# Patient Record
Sex: FEMALE | Race: WHITE | NOT HISPANIC OR LATINO | Employment: OTHER | URBAN - METROPOLITAN AREA
[De-identification: names, ages, dates, MRNs, and addresses within clinical notes are randomized per-mention and may not be internally consistent; named-entity substitution may affect disease eponyms.]

---

## 2019-01-23 ENCOUNTER — OFFICE VISIT (OUTPATIENT)
Dept: NEUROLOGY | Facility: CLINIC | Age: 74
End: 2019-01-23
Payer: MEDICARE

## 2019-01-23 VITALS
BODY MASS INDEX: 43.04 KG/M2 | WEIGHT: 274.25 LBS | HEART RATE: 72 BPM | HEIGHT: 67 IN | SYSTOLIC BLOOD PRESSURE: 119 MMHG | DIASTOLIC BLOOD PRESSURE: 65 MMHG

## 2019-01-23 DIAGNOSIS — L98.9 LESION OF SKIN OF SCALP: ICD-10-CM

## 2019-01-23 DIAGNOSIS — I10 HYPERTENSION, UNSPECIFIED TYPE: ICD-10-CM

## 2019-01-23 DIAGNOSIS — E11.65 UNCONTROLLED TYPE 2 DIABETES MELLITUS WITH HYPERGLYCEMIA: ICD-10-CM

## 2019-01-23 DIAGNOSIS — H53.8 BLURRY VISION: ICD-10-CM

## 2019-01-23 PROBLEM — S00.03XA SCALP HEMATOMA: Status: ACTIVE | Noted: 2019-01-23

## 2019-01-23 PROBLEM — E11.9 TYPE 2 DIABETES MELLITUS: Status: ACTIVE | Noted: 2019-01-23

## 2019-01-23 PROCEDURE — 99999 PR PBB SHADOW E&M-NEW PATIENT-LVL IV: CPT | Mod: PBBFAC,GC,, | Performed by: PSYCHIATRY & NEUROLOGY

## 2019-01-23 PROCEDURE — 99999 PR PBB SHADOW E&M-NEW PATIENT-LVL IV: ICD-10-PCS | Mod: PBBFAC,GC,, | Performed by: PSYCHIATRY & NEUROLOGY

## 2019-01-23 RX ORDER — ERGOCALCIFEROL 1.25 MG/1
50000 CAPSULE ORAL
COMMUNITY
End: 2023-01-18

## 2019-01-23 RX ORDER — LEVOTHYROXINE SODIUM 88 UG/1
88 TABLET ORAL DAILY
COMMUNITY

## 2019-01-23 RX ORDER — ISOSORBIDE MONONITRATE 30 MG/1
30 TABLET, EXTENDED RELEASE ORAL DAILY
COMMUNITY
End: 2023-01-18

## 2019-01-23 RX ORDER — ASPIRIN 81 MG/1
81 TABLET ORAL DAILY
COMMUNITY

## 2019-01-23 RX ORDER — DIPHENHYDRAMINE HCL 25 MG
25 CAPSULE ORAL EVERY 6 HOURS PRN
COMMUNITY

## 2019-01-23 RX ORDER — METOPROLOL TARTRATE 50 MG/1
50 TABLET ORAL 2 TIMES DAILY
COMMUNITY
End: 2023-01-18

## 2019-01-23 RX ORDER — OLMESARTAN MEDOXOMIL 20 MG/1
20 TABLET ORAL DAILY
COMMUNITY

## 2019-01-23 RX ORDER — MECLIZINE HYDROCHLORIDE CHEWABLE TABLETS 25 MG/1
32 TABLET, CHEWABLE ORAL 3 TIMES DAILY PRN
COMMUNITY
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
## 2019-01-26 PROBLEM — L98.9 LESION OF SKIN OF SCALP: Status: ACTIVE | Noted: 2019-01-23

## 2019-01-27 NOTE — ASSESSMENT & PLAN NOTE
Patient would like evaluation of this lesion that has been present for several years and reported to have been inflamed since the last couple of head traumas.    CT head ordered for blurry vision to evaluate for this as well

## 2019-01-27 NOTE — ASSESSMENT & PLAN NOTE
Reported Hba1c of 9  - has retinopathy, neuropathy and kidney complications from diabetes and HTN  - defer management to PCP

## 2019-01-31 ENCOUNTER — HOSPITAL ENCOUNTER (OUTPATIENT)
Dept: RADIOLOGY | Facility: HOSPITAL | Age: 74
Discharge: HOME | End: 2019-01-31
Attending: PSYCHIATRY & NEUROLOGY
Payer: MEDICARE

## 2019-01-31 DIAGNOSIS — H53.8 BLURRY VISION: ICD-10-CM

## 2019-01-31 DIAGNOSIS — L98.9 LESION OF SKIN OF SCALP: ICD-10-CM

## 2019-01-31 PROCEDURE — 70450 CT HEAD/BRAIN W/O DYE: CPT | Mod: 26,,, | Performed by: RADIOLOGY

## 2019-01-31 PROCEDURE — 70450 CT HEAD WITHOUT CONTRAST: ICD-10-PCS | Mod: 26,,, | Performed by: RADIOLOGY

## 2019-01-31 PROCEDURE — 70450 CT HEAD/BRAIN W/O DYE: CPT | Mod: TC

## 2019-02-06 ENCOUNTER — CLINICAL SUPPORT (OUTPATIENT)
Dept: OPHTHALMOLOGY | Facility: CLINIC | Age: 74
End: 2019-02-06
Payer: MEDICARE

## 2019-02-06 ENCOUNTER — OFFICE VISIT (OUTPATIENT)
Dept: OPHTHALMOLOGY | Facility: CLINIC | Age: 74
End: 2019-02-06
Payer: MEDICARE

## 2019-02-06 DIAGNOSIS — H53.481: ICD-10-CM

## 2019-02-06 DIAGNOSIS — H46.9 RIGHT OPTIC NEUROPATHY: ICD-10-CM

## 2019-02-06 DIAGNOSIS — E11.65 UNCONTROLLED TYPE 2 DIABETES MELLITUS WITH HYPERGLYCEMIA: Primary | ICD-10-CM

## 2019-02-06 PROCEDURE — 99999 PR PBB SHADOW E&M-EST. PATIENT-LVL III: ICD-10-PCS | Mod: PBBFAC,,, | Performed by: OPHTHALMOLOGY

## 2019-02-06 PROCEDURE — 92083 EXTENDED VISUAL FIELD XM: CPT | Mod: S$GLB,,, | Performed by: OPHTHALMOLOGY

## 2019-02-06 PROCEDURE — 92083 HUMPHREY VISUAL FIELD - OU - BOTH EYES: ICD-10-PCS | Mod: S$GLB,,, | Performed by: OPHTHALMOLOGY

## 2019-02-06 PROCEDURE — 99999 PR PBB SHADOW E&M-EST. PATIENT-LVL I: CPT | Mod: PBBFAC,,,

## 2019-02-06 PROCEDURE — 99999 PR PBB SHADOW E&M-EST. PATIENT-LVL I: ICD-10-PCS | Mod: PBBFAC,,,

## 2019-02-06 PROCEDURE — 92004 PR EYE EXAM, NEW PATIENT,COMPREHESV: ICD-10-PCS | Mod: S$GLB,,, | Performed by: OPHTHALMOLOGY

## 2019-02-06 PROCEDURE — 99999 PR PBB SHADOW E&M-EST. PATIENT-LVL III: CPT | Mod: PBBFAC,,, | Performed by: OPHTHALMOLOGY

## 2019-02-06 PROCEDURE — 92004 COMPRE OPH EXAM NEW PT 1/>: CPT | Mod: S$GLB,,, | Performed by: OPHTHALMOLOGY

## 2019-02-06 RX ORDER — OMEPRAZOLE 40 MG/1
40 CAPSULE, DELAYED RELEASE ORAL DAILY
Refills: 5 | COMMUNITY
Start: 2019-01-22 | End: 2023-09-13

## 2019-02-06 RX ORDER — ALENDRONATE SODIUM 70 MG/1
TABLET ORAL
Refills: 5 | COMMUNITY
Start: 2019-01-20

## 2019-02-06 RX ORDER — METOPROLOL SUCCINATE 50 MG/1
TABLET, EXTENDED RELEASE ORAL
Refills: 11 | COMMUNITY
Start: 2019-01-22

## 2019-02-06 RX ORDER — INSULIN ASPART 100 [IU]/ML
INJECTION, SUSPENSION SUBCUTANEOUS
Refills: 1 | COMMUNITY
Start: 2019-01-21 | End: 2023-01-18
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
## 2019-02-06 NOTE — LETTER
[facility]  [address]  [facility] [address]  Phone: [phone]  Fax: [phone]   February 6, 2019    Hetal Hill MD  [address]  [facility] [address]    Patient: Joanne Hester   MR Number: [id number]   YOB: 1945   Date of Visit: 2/6/2019       Dear Dr. Hill:    Thank you for referring Joanne Hester to me for evaluation. Here is my assessment and plan of care:    Assessment:   /Plan     For exam results, see Encounter Report.    Uncontrolled type 2 diabetes mellitus with hyperglycemia  -     CBC auto differential; Future; Expected date: 02/06/2019  -     Sedimentation rate, manual; Future; Expected date: 02/06/2019  -     C-REACTIVE PROTEIN; Future; Expected date: 02/06/2019    Generalized constriction of visual field, right  -     Grijalva Visual Field - OU - Extended - Both Eyes  -     CBC auto differential; Future; Expected date: 02/06/2019  -     Sedimentation rate, manual; Future; Expected date: 02/06/2019  -     C-REACTIVE PROTEIN; Future; Expected date: 02/06/2019    Right optic neuropathy  -     CBC auto differential; Future; Expected date: 02/06/2019  -     Sedimentation rate, manual; Future; Expected date: 02/06/2019  -     C-REACTIVE PROTEIN; Future; Expected date: 02/06/2019      Ms. Hester has a right optic neuropathy. Unfortunately, I did not see her at the time of the initial visual loss. She has two possible causes. One would be anterior ischemic optic neuropathy either arteritic or non-arteritic and the second would be traumatic optic neuropathy. Neither is treatable. I have ordered blood testing to rule out giant cell arteritis. I will repeat her exam and visual field testing in two months.          Plan:       For exam results, see Encounter Report.    Uncontrolled type 2 diabetes mellitus with hyperglycemia  -     CBC auto differential; Future; Expected date: 02/06/2019  -     Sedimentation rate, manual; Future; Expected date:  02/06/2019  -     C-REACTIVE PROTEIN; Future; Expected date: 02/06/2019    Generalized constriction of visual field, right  -     Grijalva Visual Field - OU - Extended - Both Eyes  -     CBC auto differential; Future; Expected date: 02/06/2019  -     Sedimentation rate, manual; Future; Expected date: 02/06/2019  -     C-REACTIVE PROTEIN; Future; Expected date: 02/06/2019    Right optic neuropathy  -     CBC auto differential; Future; Expected date: 02/06/2019  -     Sedimentation rate, manual; Future; Expected date: 02/06/2019  -     C-REACTIVE PROTEIN; Future; Expected date: 02/06/2019      Ms. Hester has a right optic neuropathy. Unfortunately, I did not see her at the time of the initial visual loss. She has two possible causes. One would be anterior ischemic optic neuropathy either arteritic or non-arteritic and the second would be traumatic optic neuropathy. Neither is treatable. I have ordered blood testing to rule out giant cell arteritis. I will repeat her exam and visual field testing in two months.            Below you will find my full exam findings. If you have questions, please do not hesitate to call me. I look forward to following Ms. Joanne Hester along with you.    Sincerely,          Gagan Samano MD       CC  Yovanny Constantino MD             Base Eye Exam     Visual Acuity (Snellen - Linear)       Right Left    Dist sc 20/25 -2 20/30    Dist ph sc NI 20/25 -1          Tonometry (Applanation, 1:44 PM)       Right Left    Pressure 17 16          Pupils       Dark Light Shape React APD    Right 4 2 Round Brisk +3    Left 4 2 Round Brisk None          Visual Fields     Counting fingers          Extraocular Movement       Right Left     Full, Ortho Full, Ortho          Neuro/Psych     Oriented x3:  Yes    Mood/Affect:  Normal          Dilation     Both eyes:  1% Mydriacyl, 2.5% Phenylephrine @ 2:47 PM            Additional Tests     Color       Right Left    Ishihara 14 14            Slit Lamp and  Fundus Exam     External Exam       Right Left    External Normal Normal          Slit Lamp Exam       Right Left    Lids/Lashes Normal Normal    Conjunctiva/Sclera White and quiet White and quiet    Cornea Clear Clear    Anterior Chamber Deep and quiet Deep and quiet    Iris Round and reactive Round and reactive    Lens Posterior chamber intraocular lens Posterior chamber intraocular lens    Vitreous Normal Normal          Fundus Exam       Right Left    Disc 3+ Pallor, diffuse Normal    C/D Ratio 0.3 0.3    Macula Normal Normal    Vessels Normal Normal    Periphery Normal Normal

## 2019-02-06 NOTE — PATIENT INSTRUCTIONS
Blood tests today on second floor; I will call you with results.  Repeat exam and visual field testing in two months.

## 2019-02-11 ENCOUNTER — TELEPHONE (OUTPATIENT)
Dept: OPHTHALMOLOGY | Facility: CLINIC | Age: 74
End: 2019-02-11

## 2019-02-11 DIAGNOSIS — H53.481: ICD-10-CM

## 2019-02-11 DIAGNOSIS — H46.9 RIGHT OPTIC NEUROPATHY: Primary | ICD-10-CM

## 2019-02-11 NOTE — TELEPHONE ENCOUNTER
Ms. Hester blood results were inconsistent with giant cell arteritis. I called he with the results and she indicated understanding. She will return to me in two months as planned.

## 2022-10-03 ENCOUNTER — TELEPHONE (OUTPATIENT)
Dept: NEUROSURGERY | Facility: CLINIC | Age: 77
End: 2022-10-03
Payer: MEDICARE

## 2022-10-03 DIAGNOSIS — M54.9 BACK PAIN, UNSPECIFIED BACK LOCATION, UNSPECIFIED BACK PAIN LATERALITY, UNSPECIFIED CHRONICITY: Primary | ICD-10-CM

## 2022-10-05 ENCOUNTER — HOSPITAL ENCOUNTER (OUTPATIENT)
Dept: RADIOLOGY | Facility: HOSPITAL | Age: 77
Discharge: HOME | End: 2022-10-05
Attending: PHYSICIAN ASSISTANT
Payer: MEDICARE

## 2022-10-05 ENCOUNTER — OFFICE VISIT (OUTPATIENT)
Dept: NEUROSURGERY | Facility: CLINIC | Age: 77
End: 2022-10-05
Payer: MEDICARE

## 2022-10-05 VITALS
DIASTOLIC BLOOD PRESSURE: 70 MMHG | HEIGHT: 67 IN | HEART RATE: 74 BPM | BODY MASS INDEX: 42.21 KG/M2 | WEIGHT: 268.94 LBS | SYSTOLIC BLOOD PRESSURE: 145 MMHG

## 2022-10-05 DIAGNOSIS — M54.50 CHRONIC BILATERAL LOW BACK PAIN WITHOUT SCIATICA: Primary | ICD-10-CM

## 2022-10-05 DIAGNOSIS — M51.36 DDD (DEGENERATIVE DISC DISEASE), LUMBAR: ICD-10-CM

## 2022-10-05 DIAGNOSIS — M43.16 SPONDYLOLISTHESIS, LUMBAR REGION: ICD-10-CM

## 2022-10-05 DIAGNOSIS — M54.9 BACK PAIN, UNSPECIFIED BACK LOCATION, UNSPECIFIED BACK PAIN LATERALITY, UNSPECIFIED CHRONICITY: ICD-10-CM

## 2022-10-05 DIAGNOSIS — M47.816 SPONDYLOSIS OF LUMBAR REGION WITHOUT MYELOPATHY OR RADICULOPATHY: ICD-10-CM

## 2022-10-05 DIAGNOSIS — G89.29 CHRONIC BILATERAL LOW BACK PAIN WITHOUT SCIATICA: Primary | ICD-10-CM

## 2022-10-05 DIAGNOSIS — M41.9 SCOLIOSIS, UNSPECIFIED SCOLIOSIS TYPE, UNSPECIFIED SPINAL REGION: ICD-10-CM

## 2022-10-05 PROCEDURE — 1159F PR MEDICATION LIST DOCUMENTED IN MEDICAL RECORD: ICD-10-PCS | Mod: CPTII,,, | Performed by: PHYSICIAN ASSISTANT

## 2022-10-05 PROCEDURE — 1159F MED LIST DOCD IN RCRD: CPT | Mod: CPTII,,, | Performed by: PHYSICIAN ASSISTANT

## 2022-10-05 PROCEDURE — 1160F RVW MEDS BY RX/DR IN RCRD: CPT | Mod: CPTII,,, | Performed by: PHYSICIAN ASSISTANT

## 2022-10-05 PROCEDURE — 99215 OFFICE O/P EST HI 40 MIN: CPT | Mod: PBBFAC,PN | Performed by: PHYSICIAN ASSISTANT

## 2022-10-05 PROCEDURE — 99999 PR PBB SHADOW E&M-EST. PATIENT-LVL V: CPT | Mod: PBBFAC,,, | Performed by: PHYSICIAN ASSISTANT

## 2022-10-05 PROCEDURE — 99999 PR PBB SHADOW E&M-EST. PATIENT-LVL V: ICD-10-PCS | Mod: PBBFAC,,, | Performed by: PHYSICIAN ASSISTANT

## 2022-10-05 PROCEDURE — 72110 X-RAY EXAM L-2 SPINE 4/>VWS: CPT | Mod: 26,,, | Performed by: RADIOLOGY

## 2022-10-05 PROCEDURE — 1160F PR REVIEW ALL MEDS BY PRESCRIBER/CLIN PHARMACIST DOCUMENTED: ICD-10-PCS | Mod: CPTII,,, | Performed by: PHYSICIAN ASSISTANT

## 2022-10-05 PROCEDURE — 99204 OFFICE O/P NEW MOD 45 MIN: CPT | Mod: S$PBB,,, | Performed by: PHYSICIAN ASSISTANT

## 2022-10-05 PROCEDURE — 4010F PR ACE/ARB THEARPY RXD/TAKEN: ICD-10-PCS | Mod: ,,, | Performed by: PHYSICIAN ASSISTANT

## 2022-10-05 PROCEDURE — 99204 PR OFFICE/OUTPT VISIT, NEW, LEVL IV, 45-59 MIN: ICD-10-PCS | Mod: S$PBB,,, | Performed by: PHYSICIAN ASSISTANT

## 2022-10-05 PROCEDURE — 72110 X-RAY EXAM L-2 SPINE 4/>VWS: CPT | Mod: TC,FY

## 2022-10-05 PROCEDURE — 4010F ACE/ARB THERAPY RXD/TAKEN: CPT | Mod: ,,, | Performed by: PHYSICIAN ASSISTANT

## 2022-10-05 PROCEDURE — 72110 XR LUMBAR SPINE AP AND LAT WITH FLEX/EXT: ICD-10-PCS | Mod: 26,,, | Performed by: RADIOLOGY

## 2022-10-05 RX ORDER — TIZANIDINE 4 MG/1
4 TABLET ORAL DAILY PRN
Qty: 90 TABLET | Refills: 0 | Status: SHIPPED | OUTPATIENT
Start: 2022-10-05 | End: 2023-01-05

## 2022-10-05 NOTE — PROGRESS NOTES
Subjective:     Patient ID:  Joanne Hester is a 76 y.o. female.    Dharmesh    Chief Complaint:  Chronic low back pain    HPI    Joanne Hester is a 76 y.o. female who presents with the above CC.  Patient states she had surgery in 1981 with Dr. Kinsey.  She got better but has had chronic low back pain ever since.  The pain has gotten worse in the last year.  Pain is across the low back that is worse with standing and walking and better with sitting and lying down.  She denies any radiating leg pain or paresthesias.  She does have neuropathy from the below the knees to the feet.  She has some neck pain and bilateral tingling in the hands.  No arm pain or paresthesias.        She also gets spasms in the midback region.    She went to physical therapy to help with her balance but this did not help much.  She states she has had balance trouble since her knees were replaced.  She has had an epidural steroid injection 17 years ago without relief.  No other interventional pain procedures.  She takes hydrocodone as needed for pain.  She takes gabapentin 800 mg in the morning 800 at night.    Patient denies any recent accidents or trauma, no saddle anesthesias, and no bowel or bladder incontinence.      Review of Systems:    Review of Systems   Constitutional:  Negative for chills, diaphoresis, fever, malaise/fatigue and weight loss.   HENT:  Positive for sinus pain and tinnitus. Negative for congestion, ear discharge, ear pain, hearing loss, nosebleeds and sore throat.    Eyes:  Positive for blurred vision, pain, discharge and redness. Negative for double vision and photophobia.   Respiratory:  Positive for cough and wheezing. Negative for hemoptysis, sputum production, shortness of breath and stridor.    Cardiovascular:  Positive for PND. Negative for chest pain, palpitations, orthopnea and leg swelling.   Gastrointestinal:  Positive for constipation, diarrhea and heartburn. Negative for abdominal pain, blood in stool, melena,  nausea and vomiting.   Genitourinary:  Negative for dysuria, flank pain, frequency, hematuria and urgency.   Musculoskeletal:  Positive for back pain and neck pain. Negative for falls, joint pain and myalgias.   Skin:  Positive for itching. Negative for rash.   Neurological:  Positive for tremors and weakness. Negative for dizziness, tingling, sensory change, speech change, seizures, loss of consciousness and headaches.   Endo/Heme/Allergies:  Positive for polydipsia. Negative for environmental allergies. Does not bruise/bleed easily.   Psychiatric/Behavioral:  Positive for depression. Negative for hallucinations, memory loss and substance abuse. The patient has insomnia. The patient is not nervous/anxious.        Past Medical History:   Diagnosis Date    Abnormal Pap smear of cervix     before 1979     Diabetes     Hypertension      Past Surgical History:   Procedure Laterality Date    APPENDECTOMY      CATARACT EXTRACTION Bilateral     20+years ago    CHOLECYSTECTOMY      HYSTERECTOMY       Current Outpatient Medications on File Prior to Visit   Medication Sig Dispense Refill    ACCU-CHEK PRICE CONTROL SOLN Soln 1 tablet once daily.      ACCU-CHEK PRICE PLUS METER Misc 1 m once daily.      ACCU-CHEK PRICE PLUS TEST STRP Strp 1 strip once daily.      ACCU-CHEK SOFTCLIX LANCETS Misc 1 lancet once daily.      alendronate (FOSAMAX) 70 MG tablet TAKE 1 TABLET BY MOUTH ONCE A WEEK WITH A FULL GLASS OF WATER, NOTHING BY MOUTH FOR 30 MINTUES  5    aspirin (ECOTRIN) 81 MG EC tablet Take 81 mg by mouth once daily.      atorvastatin (LIPITOR) 20 MG tablet 80 mg once daily.       citalopram (CELEXA) 20 MG tablet Take 20 mg by mouth once daily.  5    clotrimazole-betamethasone 1-0.05% (LOTRISONE) cream Apply 1 g topically 2 (two) times daily.  5    ergocalciferol (ERGOCALCIFEROL) 50,000 unit Cap Take 50,000 Units by mouth every 7 days.      ferrous fumarate 325 mg (106 mg iron) Tab Take 1 tablet by mouth once daily.  1     "gabapentin (NEURONTIN) 400 MG capsule 1 capsule once daily.      isosorbide mononitrate (IMDUR) 30 MG 24 hr tablet Take 30 mg by mouth once daily.      JARDIANCE 25 mg Tab 1 tablet once daily.      levothyroxine (SYNTHROID) 88 MCG tablet Take 88 mcg by mouth once daily.      meclizine (ANTIVERT) 32 MG tablet Take 32 mg by mouth 3 (three) times daily as needed.      metformin (GLUCOPHAGE) 1000 MG tablet 1 tablet once daily.      metoprolol succinate (TOPROL-XL) 50 MG 24 hr tablet TAKE 1 TABLET ORALLY ONCE A DAY AT NIGHT.  11    metoprolol tartrate (LOPRESSOR) 50 MG tablet Take 50 mg by mouth 2 (two) times daily.      MULTIVIT-IRON-MIN-FOLIC ACID 3,500-18-0.4 UNIT-MG-MG ORAL CHEW Take by mouth.      NOVOLIN 70/30 100 unit/mL (70-30) injection 1 tablet once daily.      NOVOLOG MIX 70-30 U-100 INSULN 100 unit/mL (70-30) Soln INJECT 65 UNITS INTO THE SKIN DAILY IN THE MORNING AND 50 UNITS AT BEDTIME  1    olmesartan (BENICAR) 20 MG tablet Take 20 mg by mouth once daily.      omeprazole (PRILOSEC) 40 MG capsule Take 40 mg by mouth once daily.  5    diphenhydrAMINE (BENADRYL) 25 mg capsule Take 25 mg by mouth every 6 (six) hours as needed for Itching.       No current facility-administered medications on file prior to visit.     Review of patient's allergies indicates:   Allergen Reactions    Caffeine     Codeine     Latex, natural rubber Rash     Social History     Socioeconomic History    Marital status:    Tobacco Use    Smoking status: Former    Smokeless tobacco: Never   Substance and Sexual Activity    Alcohol use: No    Drug use: No    Sexual activity: Not Currently     Birth control/protection: Surgical     Comment:      Family History   Problem Relation Age of Onset    Breast cancer Daughter     Colon cancer Neg Hx     Ovarian cancer Neg Hx        Objective:      Vitals:    10/05/22 0912   BP: (!) 145/70   Pulse: 74   Weight: 122 kg (268 lb 15.4 oz)   Height: 5' 7" (1.702 m)   PainSc:   4   PainLoc: " Back         Physical Exam:      General:  Joanne Hester is well-developed, well-nourished, appears stated age, in no acute distress, alert and oriented to person, place, and time.    Pulmonary/Chest:  Respiratory effort normal  Abdominal: Exhibits no distension  Psychiatric:  Normal mood and affect.  Behavior is normal.  Judgement and thought content normal      Musculoskeletal:      Lumbar ROM:   No pain in lumbar flexion, left lateral bending, and right lateral bending.  Pain on extension.    Lumbar Spine Inspection:  Midline surgical scars and no visible rashes.    Lumbar Spine Palpation:  No tenderness to low back palpation.    SI Joint Palpation:  No tenderness to SI Joint palpation.    Straight Leg Raise:  Negative right and left SLR.      Neurological:     Muscle strength against resistance:     Right Left   Hip flexion  5 / 5 5 / 5   Hip extension 5 / 5 5 / 5   Hip abduction 5 / 5 5 / 5   Hip adduction  5 / 5 5 / 5   Knee extension  5 / 5 5 / 5   Knee flexion 5 / 5 5 / 5   Dorsiflexion  5 / 5 5 / 5   EHL  5 / 5 4 / 5   Plantar flexion  5 / 5 5 / 5   Inversion of the feet 5 / 5 5 / 5   Eversion of the feet  5 / 5 5 / 5       Reflexes:     Right Left   Patellar 2+ 2+   Achilles 2+ 2+     Clonus:  Negative bilaterally    On gross examination of the bilateral upper extremities, patient has full painfree ROM with no signs of clubbing, cyanosis, edema, or weakness.       XRAY/MRI Interpretation:     Lumbar spine xrays were personally reviewed today.  No fractures.  No movement on flexion and extension.  Curvature of the spine.  Lumbarlized sacrum.  Grade 1 spondylolisthesis S1-S2.  Multilevel degenerative disc disease and spondylosis. Facet arthropathy lower lumbar spine.    Lumbar spine MRI was personally reviewed today.  Multilevel degenerative disc disease and spondylosis.  Grade 1 spondylolisthesis S1-S2.      Assessment:          1. Chronic bilateral low back pain without sciatica    2. Spondylosis of lumbar  region without myelopathy or radiculopathy    3. DDD (degenerative disc disease), lumbar    4. Spondylolisthesis, lumbar region    5. Scoliosis, unspecified scoliosis type, unspecified spinal region            Plan:          Orders Placed This Encounter    Ambulatory referral/consult to Pain Clinic    Ambulatory referral/consult to Physical/Occupational Therapy    tiZANidine (ZANAFLEX) 4 MG tablet     Patient with multilevel degenerative disc disease, spondylosis, scoliosis, S1-S2 grade 1 spondylolisthesis     -physical therapy outside of [facility]   -referred to Pain Management to consider interventional pain procedure options   -tizanidine as needed for spasms   -follow-up in 6 months.  If interventional pain procedures fail will refer to Dr. Gibbons for surgical evaluation      Follow-Up:  Follow up in about 6 months (around 4/5/2023). If there are any questions prior to this, the patient was instructed to contact the office.       Tequila Alvarez [facility], PA-C  Neurosurgery  [facility]  10/05/2022

## 2022-10-14 ENCOUNTER — TELEPHONE (OUTPATIENT)
Dept: ADMINISTRATIVE | Facility: OTHER | Age: 77
End: 2022-10-14
Payer: MEDICARE

## 2022-11-29 ENCOUNTER — TELEPHONE (OUTPATIENT)
Dept: PAIN MEDICINE | Facility: CLINIC | Age: 77
End: 2022-11-29
Payer: MEDICARE

## 2023-05-26 ENCOUNTER — HOSPITAL ENCOUNTER (OUTPATIENT)
Dept: RADIOLOGY | Facility: HOSPITAL | Age: 78
Discharge: HOME | End: 2023-05-26
Attending: FAMILY MEDICINE
Payer: MEDICARE

## 2023-05-26 DIAGNOSIS — Z78.0 ASYMPTOMATIC MENOPAUSAL STATE: ICD-10-CM

## 2023-05-26 PROCEDURE — 77080 DXA BONE DENSITY AXIAL: CPT | Mod: TC

## 2025-03-13 ENCOUNTER — TELEPHONE (OUTPATIENT)
Dept: PAIN MEDICINE | Facility: CLINIC | Age: 80
End: 2025-03-13
Payer: MEDICARE

## 2025-03-13 ENCOUNTER — OFFICE VISIT (OUTPATIENT)
Dept: PAIN MEDICINE | Facility: CLINIC | Age: 80
End: 2025-03-13
Payer: MEDICARE

## 2025-03-13 VITALS
DIASTOLIC BLOOD PRESSURE: 81 MMHG | WEIGHT: 221 LBS | HEART RATE: 77 BPM | SYSTOLIC BLOOD PRESSURE: 132 MMHG | BODY MASS INDEX: 34.69 KG/M2 | OXYGEN SATURATION: 97 % | HEIGHT: 67 IN

## 2025-03-13 DIAGNOSIS — E11.40 CHRONIC PAINFUL DIABETIC NEUROPATHY: Primary | ICD-10-CM

## 2025-03-13 DIAGNOSIS — M51.360 DEGENERATION OF INTERVERTEBRAL DISC OF LUMBAR REGION WITH DISCOGENIC BACK PAIN: ICD-10-CM

## 2025-03-13 DIAGNOSIS — M96.1 LUMBAR POSTLAMINECTOMY SYNDROME: ICD-10-CM

## 2025-03-13 DIAGNOSIS — G89.4 CHRONIC PAIN SYNDROME: ICD-10-CM

## 2025-03-13 DIAGNOSIS — M47.816 LUMBAR FACET ARTHROPATHY: ICD-10-CM

## 2025-03-13 PROCEDURE — 99999 PR PBB SHADOW E&M-EST. PATIENT-LVL V: CPT | Mod: PBBFAC,,, | Performed by: ANESTHESIOLOGY

## 2025-03-13 NOTE — TELEPHONE ENCOUNTER
----- Message from Will Allen MD sent at 3/13/2025  2:28 PM CDT -----  Interventional Therapy: Qutenza patch, Bilateral FEET .

## 2025-03-13 NOTE — PROGRESS NOTES
New Patient Evaluation  [facility]    Joanne Hester  : 1945  Date: 3/13/2025     CHIEF COMPLAINT:  fingertips and Foot Pain    Referring Physician: Cain Steele MD  Primary Care Physician: Cain Steele MD    HPI:  This is a 79 y.o. female with a chief complaint of fingertips and Foot Pain  . The patient has Past medical history/Past surgical history of  diabetes, hypertension, chronic pain syndrome, chronic opioid use, postlaminectomy syndrome    Patient was evaluated and referred by primary care provider for diabetic neuropathy in bilateral hands and feet    Diabetic: Yes    Anticoagulation medications: 81 mg Aspirin     Allergy To Iodine: No    Currently on Antibiotic: yes    Current Description of Pain Symptoms:    History of Recent Fall or Trauma: No   Onset: Chronic, started on going  Pain Location: hands and feet  Radiates/associated symptoms: N/A.   Pain is Getting worse over the last 3 months    The pain is described as aching, burning, numbing, sharp, shooting, stabbing, and throbbing.   Exacerbating factors: Sitting, Standing, Laying, and Walking.   Mitigating factors medications.   Symptoms interfere with daily activity, sleeping.   The patient feels like symptoms have been worsening.   Patient denies night fever/night sweats, urinary incontinence, bowel incontinence, significant weight loss, significant motor weakness, and loss of sensations.    Pain score:  Current: 5/10  Best: 0/10  Worst: 10/10    Current pain medications:   Hydrocodone 10-325mg PRN   PCP  Gabapentin 400 mg daily, 2 tabs 800 mg  BID  Meloxicam 15 mg, QHS  Current Narcotics/Opioid /benzo Medications:  Opioids- Hydrocodone with Acetaminophen (Norco)  Benzodiazepines: No    UDS:  NA    PDMP:  Reviewed and consistent with medication use as prescribed.     Previous Chronic Pain Treatment History:  Six weeks of conservative therapy include: Physical Therapy/HEP/Physician Lead Exercise  "Program:  Is patient actively participating in home exercise program (HEP)/ physician led exercise program in the last 6 months:  yes    Non-interventional Pain Therapy:  []Chiropractor.   []Acupuncture/Dry needle.  []TENS unit.  []Heat/ICE.  []Back Brace.    Medications previously tried:  NSAIDs: Meloxicam (Mobic)  Topical Agent: Yes  TCA/SSRI/SNRI: SSRI: Citalopram (Celexa)  Anti-convulsants: Gabapentin   Muscle Relaxants: Tizanidine (Zanaflex)  Opioids- Hydrocodone with Acetaminophen (Norco).    Interventional Pain Procedures:  Back Injection years ago- did not help    Previous spine/Relevant joint surgery:  Back surgery: 1984-  helped her lumbar radiculitis   Bilateral total knee arthroplasty  Surgical History:   has a past surgical history that includes Hysterectomy; Appendectomy; Cholecystectomy; and Cataract extraction (Bilateral).  Medical History:   has a past medical history of Abnormal Pap smear of cervix, Diabetes, and Hypertension.  Family History:  family history includes Breast cancer in her daughter; Diabetes in her father; Heart disease in her father and mother.  Allergies:  Caffeine; Codeine; and Latex, natural rubber   Social History/SUBSTANCE ABUSE HISTORY:  Personal history of substance abuse: No   reports that she has quit smoking. She has never been exposed to tobacco smoke. She has never used smokeless tobacco. She reports that she does not drink alcohol and does not use drugs.  LABS:  CBC  Lab Results   Component Value Date    WBC 8.48 02/06/2019    HGB 12.5 02/06/2019    HCT 38.5 02/06/2019     Coagulation Profile   Lab Results   Component Value Date     02/06/2019     No results found for: "PT", "PTT", "INR"  CMP:  BMP  Lab Results   Component Value Date     02/02/2023    K 5.2 (H) 02/02/2023     02/02/2023    CO2 29 02/02/2023    BUN 35 (H) 02/02/2023    CREATININE 1.40 (H) 02/02/2023    CALCIUM 9.6 02/02/2023    ANIONGAP 7 (L) 02/02/2023    EGFRNORACEVR 39 (A) " "02/02/2023     Lab Results   Component Value Date    ALT 25 02/02/2023    AST 30 02/02/2023    ALKPHOS 94 02/02/2023    BILITOT 0.6 02/02/2023     HGBA1C:  No results found for: "LABA1C", "HGBA1C"    ROS:    Review of Systems   GENERAL:  No weight loss, malaise or fevers.  HEENT:   No recent changes in vision or hearing  NECK:  Negative for lumps, no difficulty with swallowing.  RESPIRATORY:  Negative for cough, wheezing or shortness of breath, patient denies any recent URI.  CARDIOVASCULAR:  Negative for chest pain or palpitations.  GI:  Negative for abdominal discomfort, blood in stools or black stools or change in bowel habits.  MUSCULOSKELETAL:  See HPI.  SKIN:  Negative for lesions, rash, and itching.  PSYCH:  No mood disorder or recent psychosocial stressors.   HEMATOLOGY/LYMPHOLOGY:  See the blood thinner sectioned in HPI.  NEURO:  See HPI  All other reviewed and negative other than HPI.    PHYSICAL EXAM:  VITALS: /81 (BP Location: Left arm, Patient Position: Sitting)   Pulse 77   Ht 5' 7" (1.702 m)   Wt 100.2 kg (221 lb)   SpO2 97%   BMI 34.61 kg/m²   Body mass index is 34.61 kg/m².  GENERAL: Well appearing, in no acute distress, alert and oriented x3, answers questions appropriately.   PSYCH: Flat affect.  SKIN: Skin color, texture, turgor normal, no rashes or lesions.  HEAD/FACE:  Normocephalic, atraumatic. Cranial nerves grossly intact.  CV: Regular rate  PULM: No evidence of respiratory difficulty, symmetric chest rise.  GI:  Soft and non-Distended.    DIAGNOSTIC STUDIES AND MEDICAL RECORDS REVIEW:  I have personally reviewed and interpreted relevant radiology reports and reviewed relevant records from other services in the EMR.   -  MRI lumbar spine 09/2022  Levo scoliosis.  Vertebral body heights are overall maintained.  No suspicious marrow lesions.  No signal abnormalities within conus medullaris.     T9-10, T10-11 and T12-L1 discs are narrowed and desiccated with small disc " bulge-osteophytes.  Resulting T9-10 central canal stenosis, though not well evaluated it is on the edge of field of view.  At T12-L1, resulting central canal and bilateral foraminal stenosis.     L1-2, narrowed disc with broad-based disc osteophyte ridging, bilateral facet arthropathy and ligamentum flavum hypertrophy combine to result in mild central canal, bilateral lateral recess and foraminal stenosis     L2-3, narrowed disc with disc osteophyte ridging, bilateral facet osteoarthritis and ligamentum flavum hypertrophy combine to result in mild central canal and bilateral foraminal stenosis     L3-4, narrowed desiccated disc with disc bulge-osteophyte complex and bilateral facet osteoarthritis.  Evidence of prior left laminectomy at this level.  Resulting right lateral recess stenosis, moderate right foraminal stenosis and left foraminal stenosis     L4-5, narrowed desiccated disc with facet arthropathy.  Question prior laminectomy at this level as well.  No significant central canal stenosis.  Mild bilateral foraminal stenosis     L5-S1, degenerated disc with loss of height, and desiccation.  Bilateral facet osteoarthritis with grade 1 anterolisthesis.  No central canal stenosis.  Severe left foraminal stenosis, moderate right foraminal stenosis     Impression:     Scoliosis with multilevel thoracolumbar degenerative disc disease and facet osteoarthritis, detailed level by level above           Clinical Impression:  This is a pleasant 79 y.o. female patient with PMH/PSH of  diabetes, hypertension, chronic pain syndrome, chronic opioid use, postlaminectomy syndrome, presenting with bilateral hand and feet neuropathy due to diabetes.       Encounter Diagnosis:  Joanne Hester is a 79 y.o. female with the following diagnoses based on history, exam, and imagin. Chronic painful diabetic neuropathy    2. Lumbar postlaminectomy syndrome    3. Chronic pain syndrome    4. Lumbar facet arthropathy    5. Degeneration  of intervertebral disc of lumbar region with discogenic back pain     Treatment Plan:    Diagnostics/Referrals: None    Medications:   Continue as prescribed  NSAIDs: Meloxicam (Mobic)  Topical Agent: No  TCA/SSRI/SNRI: None  Anti-convulsants: Gabapentin   Muscle Relaxants: None  Opioids: Hydrocodone with Acetaminophen (Norco)-  per PCP    Interventional Therapy: Qutenza patch, Bilateral FEET .  Physical Rehabilitation: Physician led exercise program and home exercise    Patient Education: Counseled patient regarding the importance of activity modification, I have stressed the importance of physical activity and a home exercise plan to help with pain and improve health.    Follow-up: RTC for QTZ.    May consider:  addressing back pain as she has a axial low back pain: LMBB-  she does not have significant lumbar radiculitis-  needs updated MRI lumbar spine- PNS    I would like to thank Cain Steele MD for the opportunity to assist in the care of this patient. We had a very nice visit and I look forward to continuing their care. Please let me know if I can be of further assistance.     Will Allen MD  Anesthesiologist  Interventional Pain Medicine  03/13/2025    Disclaimer:  This note was prepared using voice recognition system and is likely to have sound alike errors that may have been overlooked even after proof reading.  Please call me with any questions.

## 2025-03-13 NOTE — PROGRESS NOTES
New Patient Evaluation  [facility]    Joanne Hester  : 1945  Date: 3/13/2025     CHIEF COMPLAINT:  No chief complaint on file.    Referring Physician: Cain Steele MD  Primary Care Physician: Cain Steele MD    HPI:  This is a 79 y.o. female with a chief complaint of No chief complaint on file.  . The patient has Past medical history/Past surgical history of  diabetes, hypertension, chronic pain syndrome, chronic opioid use    Patient was evaluated and referred by  primary care provider for diabetic neuropathy    Diabetic: {GAYes/No/NA:35760}    {Anticoagulation medications:86781}    Allergy To Iodine: {GAYes/No/NA:08723}    Currently on Antibiotic: {GAYes/No/NA:02168}    Current Description of Pain Symptoms:    History of Recent Fall or Trauma: {GAYes/No/NA:28506}   Onset: Chronic, started ***  Pain Location: ***  Radiates/associated symptoms: ***.   Pain is Getting worse over the last *** months    The pain is described as {Desc; pain character:42878}.   Exacerbating factors: {Causes; Pain:71380}.   Mitigating factors ***.   Symptoms interfere with daily activity, sleeping, and ***.   The patient feels like symptoms have been {IUW:45831}.   Patient {Denies / Reports:23880} {RED FLAGS:45441}.    Pain score:   Current: {PAIN 0-10:54925}/10  Best: {PAIN 0-10:09718}/10  Worst: {PAIN 0-10:52168}/10    Current pain medications:   Hydrocodone   Gabapentin 400 mg daily  Meloxicam    Current Narcotics/Opioid /benzo Medications:  Opioids- Hydrocodone with Acetaminophen (Norco)  Benzodiazepines: No    UDS:  NA    PDMP:  Reviewed and consistent with medication use as prescribed.     Previous Chronic Pain Treatment History:  Six weeks of conservative therapy include: Physical Therapy/HEP/Physician Lead Exercise Program:  Over the past 12 months, Patient has done  *** sessions.  PT response: {PT response:10066} Helpful.   Dates of the PT sessions: ***, ***.  Is patient actively  "participating in home exercise program (HEP)/ physician led exercise program in the last 6 months: {GAYes/No/NA:18726}.    Non-interventional Pain Therapy:  []Chiropractor.   []Acupuncture/Dry needle.  []TENS unit.  []Heat/ICE.  []Back Brace.    Medications previously tried:  NSAIDs: {GANSIAD:11111}  Topical Agent: {GAYes/No/NA:64057}  TCA/SSRI/SNRI: {GATCA/SSRI/SNRI:00242}  Anti-convulsants: {GAAnticonvulsants:49027}  Muscle Relaxants: {GAmuscle Relaxant:24692}  Opioids- {GAopioid:07160}.    Interventional Pain Procedures:  ***    Previous spine/Relevant joint surgery:  ***  Surgical History:   has a past surgical history that includes Hysterectomy; Appendectomy; Cholecystectomy; and Cataract extraction (Bilateral).  Medical History:   has a past medical history of Abnormal Pap smear of cervix, Diabetes, and Hypertension.  Family History:  family history includes Breast cancer in her daughter; Diabetes in her father; Heart disease in her father and mother.  Allergies:  Caffeine; Codeine; and Latex, natural rubber   Social History/SUBSTANCE ABUSE HISTORY:  Personal history of substance abuse: No   reports that she has quit smoking. She has never been exposed to tobacco smoke. She has never used smokeless tobacco. She reports that she does not drink alcohol and does not use drugs.  LABS:  CBC  Lab Results   Component Value Date    WBC 8.48 02/06/2019    HGB 12.5 02/06/2019    HCT 38.5 02/06/2019     Coagulation Profile   Lab Results   Component Value Date     02/06/2019     No results found for: "PT", "PTT", "INR"  CMP:  BMP  Lab Results   Component Value Date     02/02/2023    K 5.2 (H) 02/02/2023     02/02/2023    CO2 29 02/02/2023    BUN 35 (H) 02/02/2023    CREATININE 1.40 (H) 02/02/2023    CALCIUM 9.6 02/02/2023    ANIONGAP 7 (L) 02/02/2023    EGFRNORACEVR 39 (A) 02/02/2023     Lab Results   Component Value Date    ALT 25 02/02/2023    AST 30 02/02/2023    ALKPHOS 94 02/02/2023    BILITOT 0.6 " "02/02/2023     HGBA1C:  No results found for: "LABA1C", "HGBA1C"    ROS:    Review of Systems   GENERAL:  No weight loss, malaise or fevers.  HEENT:   No recent changes in vision or hearing  NECK:  Negative for lumps, no difficulty with swallowing.  RESPIRATORY:  Negative for cough, wheezing or shortness of breath, patient denies any recent URI.  CARDIOVASCULAR:  Negative for chest pain or palpitations.  GI:  Negative for abdominal discomfort, blood in stools or black stools or change in bowel habits.  MUSCULOSKELETAL:  See HPI.  SKIN:  Negative for lesions, rash, and itching.  PSYCH:  No mood disorder or recent psychosocial stressors.   HEMATOLOGY/LYMPHOLOGY:  See the blood thinner sectioned in HPI.  NEURO:  See HPI  All other reviewed and negative other than HPI.    PHYSICAL EXAM:  VITALS: There were no vitals taken for this visit.  There is no height or weight on file to calculate BMI.  GENERAL: Well appearing, in no acute distress, alert and oriented x3, answers questions appropriately.   PSYCH: Flat affect.  SKIN: Skin color, texture, turgor normal, no rashes or lesions.  HEAD/FACE:  Normocephalic, atraumatic. Cranial nerves grossly intact.  CV: Regular rate  PULM: No evidence of respiratory difficulty, symmetric chest rise.  GI:  Soft and non-Distended.  NECK: ({GA+:78334}) pain to palpation over the cervical paraspinous muscles. Spurling:{GA+:78247}. ({GA+:30931}) pain with neck flexion, extension, or lateral flexion, Muscle strength in RT UE ***/5 and Left UE ***/5, Hand  ***/5, left Hand ***/5  BACK/SIJ/HIP:  Lumbar Spine Exam:       Inspection: No erythema, bruising.       Palpation: ({GA+:23784}) TTP of lumbar paraspinals bilaterally      ROM:  Limited in flexion, extension, lateral bending.       ({GA+:96951}) Facet loading {GAHip:57393}      ({GA+:08598}) Straight Leg Raise, {GAHip:00695}      ({GA+:77830}) ANGELA, Tenderness over the PSIS, Yeoman test, {GAHip:75526}  Hip Exam:      Inspection: " "No gross deformity or apparent leg length discrepancy      Palpation:  No TTP to bilateral greater trochanteric bursas.       ROM:  *** limitation Due to pain in internal rotation, external rotation b/l  Neurologic Exam:     Alert. Speech is fluent and appropriate.     Strength: ***/5 in {GAHip:78077} hip flexion and knee extension     Sensation:  Grossly intact to light touch in bilateral lower extremities     Tone: No abnormality appreciated in bilateral lower extremities  Knee exam:  No gross deformity or apparent leg length discrepancy, positive tenderness over the anteromedial aspect of the knee cap, positive limitation due to pain in flexion and extension, sensation  grossly intact to light touch in bilateral lower extremity, no atrophy or tone abnormalities    GAIT: {GAgait:24975}    DIAGNOSTIC STUDIES AND MEDICAL RECORDS REVIEW:  I have personally reviewed and interpreted relevant radiology reports and reviewed relevant records from other services in the EMR.   ***  Clinical Impression:  This is a pleasant 79 y.o. female patient with PMH/PSH of ***, presenting with***.     We discussed the underlying diagnoses and multiple treatment options including non-opioid medications, interventional procedures, physical therapy, home exercise, core muscle enhancement, and weight loss.  The risks and benefits of each treatment option were discussed and all questions were answered.      Encounter Diagnosis:  Joanne Hester is a 79 y.o. female with the following diagnoses based on history, exam, and imaging:  There are no diagnoses linked to this encounter.     Treatment Plan:    Diagnostics/Referrals: {gaimage:34854}    Medications:    NSAIDs: {GANSIAD:53653}  Topical Agent: {GAYes/No/NA:90789}  TCA/SSRI/SNRI: {GATCA/SSRI/SNRI:44808}  Anti-convulsants: {GAAnticonvulsants:35531}  Muscle Relaxants: {GAmuscle Relaxant:46666}  Opioids: {GAopioid:03125::"None"}  Patient was educated about the risk and benefit of chronic opioid " therapy including dependency, addiction, diversion, and opioid hyperalgesia.  Interventional Therapy: {GAProcedure:90569}.  Sedation: {GAsedation:80822}.  {Anticoagulation medications:99038} -Clearance to stop Blood thinner: {GAYes/No/NA:73745}    Regarding the above interventions, the patient has been educated regarding the risks (including bleeding, infection, increased pain, nerve damage, or allergic reaction), benefits, and alternatives. The patient states she understands and is eager to proceed.    Physical Rehabilitation: {GAPT:30781}    Patient Education: Counseled patient regarding the importance of {:08301}, I have stressed the importance of physical activity and a home exercise plan to help with pain and improve health.    Follow-up: RTC ***.    May consider:     I would like to thank Cain Steele MD for the opportunity to assist in the care of this patient. We had a very nice visit and I look forward to continuing their care. Please let me know if I can be of further assistance.     Will Allen MD  Anesthesiologist  Interventional Pain Medicine  03/13/2025    Disclaimer:  This note was prepared using voice recognition system and is likely to have sound alike errors that may have been overlooked even after proof reading.  Please call me with any questions.

## 2025-03-14 ENCOUNTER — TELEPHONE (OUTPATIENT)
Dept: NEUROLOGY | Facility: CLINIC | Age: 80
End: 2025-03-14
Payer: MEDICARE

## 2025-03-14 NOTE — TELEPHONE ENCOUNTER
Contacted patient as per  referral to neurology. Patient states had appointment yesterday and ended call immediately.

## 2025-03-25 ENCOUNTER — PROCEDURE VISIT (OUTPATIENT)
Dept: PAIN MEDICINE | Facility: CLINIC | Age: 80
End: 2025-03-25
Payer: MEDICARE

## 2025-03-25 VITALS
SYSTOLIC BLOOD PRESSURE: 110 MMHG | DIASTOLIC BLOOD PRESSURE: 62 MMHG | HEIGHT: 67 IN | BODY MASS INDEX: 34.69 KG/M2 | OXYGEN SATURATION: 92 % | WEIGHT: 221 LBS | HEART RATE: 77 BPM

## 2025-03-25 DIAGNOSIS — E11.40 CHRONIC PAINFUL DIABETIC NEUROPATHY: Primary | ICD-10-CM

## 2025-03-25 NOTE — PROCEDURES
PROCEDURE: Quenza (Capsaicin 8% topical system) - left foot.    Right foot has open sores of the 2nd toe    PATIENT NAME: Joanne Hester   MRN: [id number]     DATE OF PROCEDURE: 03/25/2025    Diagnosis: Diabetes mellitus due to underlying condition with diabetic neuropathy    CPT code:  (Capsaicin patch per sq cm)    Patch # 1 this year.    Postprocedural Diagnosis: Same    Complications: None  Outcome: Good    Informed Consent:  The patient's condition and proposed procedures, risks (including complications of nerve damage, skin irritation, infection, and failure of pain relief), and alternatives were discussed with the patient or responsible party.  The patient's/responsible party's questions were answered.  The patient/responsible party appeared to understand and chose to proceed.  Informed consent was obtained.    Procedural Pause:  A procedural pause verifying correct patient, medical record number, allergies, medications to be administered, current vital signs, and surgical site was performed immediately prior to beginning the procedure.    Procedure in Detail:  The procedure was performed in the procedure treatment room.  After obtaining written consent, the patient was placed in a supine position. 3 patches were used for the procedure today.  The patient applied 4% lidocaine ointment to the area 1 hour prior to treatment., and the target area was outlined with a marker.  The patch(es) were applied to the target area and secured with tape.  A coban was used to further secure the patches in place.  The patient was allowed to rest in a comfortable position, and monitored by staff every few minutes to ensure comfort.  The option for ice pack was provided to the patient should they start experiencing burning.  The patch(es) remained in place for 60 minutes.  The patch(es) were then removed and the cleaning gel was applied and allowed to sit for an additional 60 seconds, after which the area was wiped clean.      The patient was then discharged in stable condition.      Note Electronically Signed By:  Will Allen  03/25/2025